# Patient Record
(demographics unavailable — no encounter records)

---

## 2024-12-11 NOTE — DISCUSSION/SUMMARY
[FreeTextEntry1] : Two year old female with fever, cough, and sore throat most likely due to viral etiology. Cough resembled that of a croup cough. Recommended use of budesonide nebulizer treatment twice daily, and if symptoms worsening can give one time dose of oral steroids. Rapid strep negative and will follow-up with throat culture. Recommend using mist from a humidifier. Allow the child to breathe cool air during the night by opening a window or door. Fever can be treated with an over-the-counter medication such as acetaminophen or ibuprofen. Coughing can be treated with warm, clear fluids to loosen mucus on the vocal cords. Warm water, apple juice, or lemonade is safe for children older than four months. Frozen juice popsicles also can be given. Keep the child's head elevated. If the child's stridor does not improve contact health care provider immediately.

## 2024-12-11 NOTE — HISTORY OF PRESENT ILLNESS
[Fever] : FEVER [___ Day(s)] : [unfilled] day(s) [Intermittent] : intermittent [Irritable] : irritable [Consolable] : consolable [Known Exposure to COVID-19] : no known exposure to COVID-19 [Hx of recent COVID-19 infection] : no history of recent COVID-19 infection [At Night] : at night [In Morning] : in morning [Acetaminophen] : acetaminophen [Ibuprofen] : ibuprofen [Change in sleep pattern] : change in sleep pattern [Eye Redness] : no eye redness [Eye Discharge] : no eye discharge [Ear Tugging] : no ear tugging [Runny Nose] : runny nose [Nasal Congestion] : nasal congestion [Teething] : no teething [Cough] : cough [Wheezing] : no wheezing [Decreased Appetite] : decreased appetite [Vomiting] : no vomiting [Diarrhea] : no diarrhea [Decreased Urine Output] : no decreased urine output [Rash] : no rash

## 2024-12-11 NOTE — PHYSICAL EXAM
[Erythematous Oropharynx] : erythematous oropharynx [NL] : warm, clear [FreeTextEntry7] : + croupy cough

## 2024-12-28 NOTE — HISTORY OF PRESENT ILLNESS
[Influenza] : Influenza [FreeTextEntry1] : 3 y/o female here for flu vaccine.   No recent illness or fevers.  no concerns from dad today.

## 2024-12-28 NOTE — DISCUSSION/SUMMARY
[FreeTextEntry1] : 1 y/o female here for flu shot  No recent illness or fevers. Vaccine given in L deltoid.  [] : The components of the vaccine(s) to be administered today are listed in the plan of care. The disease(s) for which the vaccine(s) are intended to prevent and the risks have been discussed with the caretaker.  The risks are also included in the appropriate vaccination information statements which have been provided to the patient's caregiver.  The caregiver has given consent to vaccinate.

## 2025-01-30 NOTE — DISCUSSION/SUMMARY
[FreeTextEntry1] : Most likely resolving croup. Rapid flu and strep negative.   pharyngitis due to viral illness or early strep. Rapid strep is negative, culture sent out. Supportive care with anti-inflammatory medications, decongestants if needed and warm sweet liquids. Follow up in 3-4 days for strep culture. Recommend using mist from a humidifier. Allow the child to breathe cool air during the night by opening a window or door. Fever can be treated with an over-the-counter medication such as acetaminophen or ibuprofen. Coughing can be treated with warm, clear fluids to loosen mucus on the vocal cords. Warm water, apple juice, or lemonade is safe for children older than four months. Frozen juice popsicles also can be given. Keep the child's head elevated. If the child's stridor does not improve contact health care provider immediately.

## 2025-01-30 NOTE — HISTORY OF PRESENT ILLNESS
[EENT/Resp Symptoms] : EENT/RESPIRATORY SYMPTOMS [Nasal congestion] : nasal congestion [Runny nose] : runny nose [Cough] : cough [Chest congestion] : chest congestion [___ Day(s)] : [unfilled] day(s) [Intermittent] : intermittent [Playful] : playful [Change in sleep pattern] : change in sleep pattern [Sick Contacts: ___] : sick contacts: [unfilled] [Mucoid discharge] : mucoid discharge [Wet cough] : wet cough [At Night] : at night [Nasal saline] : nasal saline [Nasal suctioning] : nasal suctioning [Acetaminophen] : acetaminophen [Ibuprofen] : ibuprofen [Fever] : fever [Decreased Appetite] : decreased appetite [Posttussive emesis] : posttussive emesis [Max Temp: ____] : Max temperature: [unfilled] [Improving] : improving [Known Exposure to COVID-19] : no known exposure to COVID-19 [Runny Nose] : runny nose [FreeTextEntry4] : have nebulizer but child refuses and is afraid of the noise.